# Patient Record
Sex: FEMALE | Race: BLACK OR AFRICAN AMERICAN | NOT HISPANIC OR LATINO | URBAN - METROPOLITAN AREA
[De-identification: names, ages, dates, MRNs, and addresses within clinical notes are randomized per-mention and may not be internally consistent; named-entity substitution may affect disease eponyms.]

---

## 2017-01-12 ENCOUNTER — OFFICE VISIT (OUTPATIENT)
Dept: RETAIL CLINIC | Facility: CLINIC | Age: 22
End: 2017-01-12

## 2017-01-12 DIAGNOSIS — Z02.83 ENCOUNTER FOR DRUG SCREENING: Primary | ICD-10-CM

## 2017-01-12 PROBLEM — Z02.1 DRUG SCREENING, PRE-EMPLOYMENT: Status: ACTIVE | Noted: 2017-01-12

## 2017-01-12 NOTE — PATIENT INSTRUCTIONS
Instructed client that we are only a collection site. Client was told that their results will go straight to their employer, and they will give them a call once they have received them.   Client was given a Custody Control Form. Client was informed that this document serves as proof that they were here, and that they are to hold onto this documentation until they have received their results. Client verbalized understanding

## 2021-04-16 ENCOUNTER — BULK ORDERING (OUTPATIENT)
Dept: CASE MANAGEMENT | Facility: OTHER | Age: 26
End: 2021-04-16

## 2021-04-16 DIAGNOSIS — Z23 IMMUNIZATION DUE: ICD-10-CM
